# Patient Record
Sex: FEMALE | NOT HISPANIC OR LATINO | URBAN - METROPOLITAN AREA
[De-identification: names, ages, dates, MRNs, and addresses within clinical notes are randomized per-mention and may not be internally consistent; named-entity substitution may affect disease eponyms.]

---

## 2020-10-08 ENCOUNTER — APPOINTMENT (RX ONLY)
Dept: URBAN - METROPOLITAN AREA CLINIC 299 | Facility: CLINIC | Age: 60
Setting detail: DERMATOLOGY
End: 2020-10-08

## 2020-10-08 PROBLEM — M79.9 SOFT TISSUE DISORDER, UNSPECIFIED: Status: ACTIVE | Noted: 2020-10-08

## 2020-10-08 PROCEDURE — 99202 OFFICE O/P NEW SF 15 MIN: CPT

## 2020-10-08 PROCEDURE — ? DEFER

## 2020-10-08 NOTE — PROCEDURE: MIPS QUALITY
Quality 128: Preventive Care And Screening: Body Mass Index (Bmi) Screening And Follow-Up Plan: BMI is documented within normal parameters and no follow-up plan is required.
Detail Level: Detailed
Quality 130: Documentation Of Current Medications In The Medical Record: Current Medications Documented
Quality 431: Preventive Care And Screening: Unhealthy Alcohol Use - Screening: Patient screened for unhealthy alcohol use using a single question and scores less than 2 times per year
Quality 226: Preventive Care And Screening: Tobacco Use: Screening And Cessation Intervention: Tobacco Screening not Performed for Medical Reasons

## 2020-10-08 NOTE — PROCEDURE: DEFER
Detail Level: Detailed
Introduction Text (Please End With A Colon): Defer;
Instructions (Optional): The lack of scaling, itching, association of facial swelling with intranasal wounds, and improvement with antibiotics points towards infection (erysipelas) and not eczematous dermatitis or allergic contact dermatitis. There are granulomatous changes points against sarcoidosis and lack of symmetry, scarring, and improvement with antibiotics points against SLE, DLE. Discussed this with patient. Would recommend in depth discussion with ENT regarding possible abscess, intranasal root of infection entry. Highly recommended culture of any wounds that are present inside the nose.

## 2022-01-20 ENCOUNTER — IMPORTED ENCOUNTER (OUTPATIENT)
Dept: URBAN - METROPOLITAN AREA CLINIC 59 | Facility: CLINIC | Age: 62
End: 2022-01-20

## 2022-01-20 PROBLEM — I10 PRIMARY HYPERTENSION: Noted: 2022-01-20

## 2022-01-20 PROBLEM — H25.13 BILATERAL NUCLEAR SCLEROSIS CATARACTS: Noted: 2022-01-20

## 2022-01-20 PROBLEM — Z01.01 ENCOUNTER FOR EXAMINATION OF VISION WITH ABNORMAL FINDINGS: Noted: 2022-01-20

## 2022-01-20 PROBLEM — Z01.00 ROUTINE EYE EXAM: Noted: 2022-01-20

## 2022-01-20 PROCEDURE — 92015 DETERMINE REFRACTIVE STATE: CPT

## 2023-01-24 ENCOUNTER — ESTABLISHED COMPREHENSIVE EXAM (OUTPATIENT)
Dept: URBAN - METROPOLITAN AREA CLINIC 22 | Facility: CLINIC | Age: 63
End: 2023-01-24

## 2023-01-24 DIAGNOSIS — Z01.00: ICD-10-CM

## 2023-01-24 PROCEDURE — S0621 ROUTINE OPHTHALMOLOGICAL EXA: HCPCS

## 2023-01-24 ASSESSMENT — TONOMETRY
OS_IOP_MMHG: 14
OD_IOP_MMHG: 13

## 2023-01-24 ASSESSMENT — VISUAL ACUITY
OD_CC: 20/25
OS_CC: 20/30

## 2023-10-14 ASSESSMENT — VISUAL ACUITY
OD_CC: 20/20
OD_CC: J1
OS_CC: J1
OS_CC: 20/20

## 2023-10-14 ASSESSMENT — TONOMETRY
OS_IOP_MMHG: 14
OD_IOP_MMHG: 14

## 2025-02-17 ENCOUNTER — ESTABLISHED COMPREHENSIVE EXAM (OUTPATIENT)
Dept: URBAN - METROPOLITAN AREA CLINIC 22 | Facility: CLINIC | Age: 65
End: 2025-02-17

## 2025-02-17 DIAGNOSIS — Z01.00: ICD-10-CM

## 2025-02-17 PROCEDURE — 92012 INTRM OPH EXAM EST PATIENT: CPT

## 2025-02-17 PROCEDURE — 92015 DETERMINE REFRACTIVE STATE: CPT

## 2025-02-17 ASSESSMENT — TONOMETRY
OS_IOP_MMHG: 17
OD_IOP_MMHG: 16

## 2025-02-17 ASSESSMENT — VISUAL ACUITY
OS_CC: 20/20
OD_CC: 20/25